# Patient Record
Sex: MALE | Race: WHITE | HISPANIC OR LATINO
[De-identification: names, ages, dates, MRNs, and addresses within clinical notes are randomized per-mention and may not be internally consistent; named-entity substitution may affect disease eponyms.]

---

## 2022-08-02 ENCOUNTER — APPOINTMENT (OUTPATIENT)
Dept: PEDIATRIC ORTHOPEDIC SURGERY | Facility: CLINIC | Age: 22
End: 2022-08-02

## 2022-08-02 VITALS — BODY MASS INDEX: 28.72 KG/M2 | HEIGHT: 67 IN | WEIGHT: 183 LBS

## 2022-08-02 DIAGNOSIS — Z87.898 PERSONAL HISTORY OF OTHER SPECIFIED CONDITIONS: ICD-10-CM

## 2022-08-02 DIAGNOSIS — Z78.9 OTHER SPECIFIED HEALTH STATUS: ICD-10-CM

## 2022-08-02 PROBLEM — Z00.00 ENCOUNTER FOR PREVENTIVE HEALTH EXAMINATION: Status: ACTIVE | Noted: 2022-08-02

## 2022-08-02 PROCEDURE — 73030 X-RAY EXAM OF SHOULDER: CPT

## 2022-08-02 PROCEDURE — 99202 OFFICE O/P NEW SF 15 MIN: CPT

## 2022-08-02 NOTE — PHYSICAL EXAM
[de-identified] : Examination today reveals normal motion to the cervical spine without spasm or tenderness the left shoulder is symmetric in appearance to the opposite side.  No swelling/bruising.  He does have good motion though it is with discomfort at the limits.  There is no obvious clicking or popping on either active or passive motion.  No instability on stress of the glenohumeral joint.  He is not tender over the AC joint his strength is acceptable for age.  Neurovascular status is intact.\par \par X-rays ordered and taken today 3 views of the left shoulder were unremarkable

## 2022-08-02 NOTE — HISTORY OF PRESENT ILLNESS
[de-identified] : This 22-year-old right-handed healthy young man is seen for evaluation of his left shoulder.  He was well until 5 days ago when while lifting weights above head he lost his balance to weight fell backwards and he felt acute strain of his left shoulder.  He did not have the sensation of the shoulder moving out of place no clicking or popping.  Subsequent to this he had pain with stiffness no swelling.  The pain did not radiate distally no numbness or paresthesias.  Prior to this he had no history of shoulder complaints.  His past history is unremarkable

## 2022-08-02 NOTE — CONSULT LETTER
[Dear  ___] : Dear  [unfilled], [Consult Letter:] : I had the pleasure of evaluating your patient, [unfilled]. [Please see my note below.] : Please see my note below. [Consult Closing:] : Thank you very much for allowing me to participate in the care of this patient.  If you have any questions, please do not hesitate to contact me. [Sincerely,] : Sincerely, [FreeTextEntry3] : Dr Odell Smith JR.\par

## 2022-08-02 NOTE — ASSESSMENT
[FreeTextEntry1] : Impression: Acute strain left shoulder.\par \par He will use a sling for comfort along with a course of Toradol with GI precautions.  No exercise to be performed in the gym.  I will see him in 1 week for follow-up.

## 2022-08-03 RX ORDER — KETOROLAC TROMETHAMINE 10 MG/1
10 TABLET, FILM COATED ORAL 3 TIMES DAILY
Qty: 12 | Refills: 0 | Status: ACTIVE | COMMUNITY
Start: 2022-08-03 | End: 1900-01-01

## 2022-08-12 ENCOUNTER — APPOINTMENT (OUTPATIENT)
Dept: PEDIATRIC ORTHOPEDIC SURGERY | Facility: CLINIC | Age: 22
End: 2022-08-12

## 2022-08-15 ENCOUNTER — APPOINTMENT (OUTPATIENT)
Dept: PEDIATRIC ORTHOPEDIC SURGERY | Facility: CLINIC | Age: 22
End: 2022-08-15

## 2022-08-15 VITALS — HEIGHT: 67 IN | WEIGHT: 183 LBS | BODY MASS INDEX: 28.72 KG/M2

## 2022-08-15 PROCEDURE — 99212 OFFICE O/P EST SF 10 MIN: CPT

## 2022-08-15 RX ORDER — NAPROXEN 375 MG/1
375 TABLET, DELAYED RELEASE ORAL TWICE DAILY
Qty: 60 | Refills: 1 | Status: ACTIVE | COMMUNITY
Start: 2022-08-15 | End: 1900-01-01

## 2022-08-15 NOTE — PHYSICAL EXAM
[de-identified] : On exam his motion is almost full slight restriction of full abduction and external rotation is no evidence of clicking or popping on either active or passive motion and no obvious instability on stress of the glenohumeral joint.  Negative Corina and Bo.

## 2022-08-15 NOTE — HISTORY OF PRESENT ILLNESS
[de-identified] : This 22-year-old returns for reevaluation of the left shoulder.  He has noted a lot of improvement since last seen.  He has not had any clicking popping or sensation of instability his pain does not radiate distally

## 2022-08-15 NOTE — ASSESSMENT
[FreeTextEntry1] : Impression: Strain left shoulder.\par \par I have placed him on Naprosyn with GI precautions.  Physical therapy has been ordered he will stay away from lifting weights I will see him in 1 month for follow-up if he is not improving MRI is likely to become necessary

## 2022-08-18 RX ORDER — NABUMETONE 750 MG/1
750 TABLET, FILM COATED ORAL TWICE DAILY
Qty: 30 | Refills: 3 | Status: ACTIVE | COMMUNITY
Start: 2022-08-18 | End: 1900-01-01

## 2022-09-12 ENCOUNTER — APPOINTMENT (OUTPATIENT)
Dept: PEDIATRIC ORTHOPEDIC SURGERY | Facility: CLINIC | Age: 22
End: 2022-09-12

## 2022-09-12 VITALS — HEIGHT: 67 IN | WEIGHT: 183 LBS | BODY MASS INDEX: 28.72 KG/M2

## 2022-09-12 DIAGNOSIS — S46.012A STRAIN OF MUSCLE(S) AND TENDON(S) OF THE ROTATOR CUFF OF LEFT SHOULDER, INITIAL ENCOUNTER: ICD-10-CM

## 2022-09-12 PROCEDURE — 99212 OFFICE O/P EST SF 10 MIN: CPT

## 2022-09-12 NOTE — HISTORY OF PRESENT ILLNESS
[de-identified] : This 22-year-old returns for follow-up of his left shoulder he has seen significant improvement with therapy rest and time.  He has no significant complaints on today's visit

## 2022-09-12 NOTE — ASSESSMENT
[FreeTextEntry1] : Impression: Strain left shoulder much improved.\par \par He will finish out his physical therapy and return as necessary

## 2022-09-12 NOTE — PHYSICAL EXAM
[de-identified] : His exam today he has excellent motion to the entire left shoulder girdle symmetric to the opposite side no instability on stress no apprehension.  No clicking or popping.